# Patient Record
Sex: FEMALE | Race: WHITE
[De-identification: names, ages, dates, MRNs, and addresses within clinical notes are randomized per-mention and may not be internally consistent; named-entity substitution may affect disease eponyms.]

---
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
## 2019-12-06 ENCOUNTER — HOSPITAL ENCOUNTER (OUTPATIENT)
Dept: HOSPITAL 92 - L&D/OP | Age: 19
Discharge: HOME | End: 2019-12-06
Attending: OBSTETRICS & GYNECOLOGY
Payer: COMMERCIAL

## 2019-12-06 DIAGNOSIS — O99.282: ICD-10-CM

## 2019-12-06 DIAGNOSIS — Z88.1: ICD-10-CM

## 2019-12-06 DIAGNOSIS — R10.9: ICD-10-CM

## 2019-12-06 DIAGNOSIS — R12: ICD-10-CM

## 2019-12-06 DIAGNOSIS — O99.89: Primary | ICD-10-CM

## 2019-12-06 DIAGNOSIS — Z3A.27: ICD-10-CM

## 2019-12-06 DIAGNOSIS — E03.9: ICD-10-CM

## 2019-12-06 PROCEDURE — 99283 EMERGENCY DEPT VISIT LOW MDM: CPT

## 2019-12-06 NOTE — PRG
DATE OF SERVICE:  



PRIMARY OBSTETRICIAN:  Kinza Massey MD



CHIEF COMPLAINT:  Abdominal pain, chest pain.



HISTORY OF PRESENT ILLNESS:  The patient is a 19-year-old female with an

intrauterine pregnancy at 27 weeks and 4 days, presenting after waking up at 2 
in

the morning to go to the bathroom and noticing that she was having bad heartburn

that progressively became worse at the top of her stomach.  This heartburn 
continued

to worsen to the point that she felt like she needed to come to the emergency 
room.

At one point, she felt like the pain was best described as heaviness in her 
chest.

At the time of evaluation, the patient says the pain has gotten much better.  
She

does report eating cheese cake last night about 11 o'clock.  She also says that 
she

gets frequent heartburn at night and is not relieved by Tums.  She reports 
heartburn

during the day at times.  She denies any complications has been present with the

pregnancy.  Chest pain per HPI.  She denies nausea, vomiting, diarrhea,

constipation, hip problems, knee problems, muscle weakness, vaginal bleeding,

leakage of fluid, urinary urgency or frequency. 



PAST MEDICAL HISTORY:  Significant for hypothyroidism diagnosed early in this

pregnancy. 



PAST SURGICAL HISTORY:  [place] tooth removal.



SOCIAL HISTORY:  Denies drug, alcohol, or tobacco use.



ALLERGIES:  NO KNOWN DRUG ALLERGIES.



MEDICATIONS:  Prenatal vitamins.



OB LABS:  Unavailable at time of dictation.



REVIEW OF SYSTEMS:  Per HPI.



PHYSICAL EXAMINATION:

VITAL SIGNS:  Blood pressure is 107/73, heart rate 99, respiratory rate 20,

saturating 100% on room air, and temperature 97.7. 

GENERAL:  She appears to be in no acute distress.  She is alert, oriented,

cooperative, and pleasant to interact with. 

HEAD:  Normocephalic and atraumatic. 

LUNGS:  Clear to auscultation bilaterally. 

HEENT:  The patient has nystagmus that she reports is part of a medical 
condition

that she has that she cannot name, but it has been evaluated. 

ABDOMEN:  Gravid, soft, nontender. 

EXTREMITIES:  Nontender nonedematous. 

CHEST:  Nontender to palpation along the parasternal region. 



Fetal heart tracing shows a baseline in the 140s with moderate long-term

variability, positive 10 x 10 accelerations.  No contractions on the monitor. 



The patient was given Bicitra, which immediately within a few minutes of taking 
it

has reduced her pain significantly. 



ASSESSMENT AND PLAN:  The patient is a 19-year-old female with an intrauterine

pregnancy at 27 weeks gestation with bad heartburn, patient unrelieved by Tums 
in

the past.  Bicitra seems to help neutralize what is currently happening, we are 
also

giving her 20 mg of Pepcid and instructions to purchase ranitidine over the 
counter

75 mg to be taken at night.  She has been counseled not to eat late at night, 
but to

make her dinner her last meal, to take the Zantac about 30 minutes before she 
eats

and to see if this improves her symptoms at night.  She can take the Zantac up 
to 2

times a day if needed before her meals.  She has an appointment with Dr. Massey

that she can follow up with and fetus is reassuring by fetal heart tracing. 







Job ID:  [id number]



MTDD

## 2020-02-27 ENCOUNTER — HOSPITAL ENCOUNTER (INPATIENT)
Dept: HOSPITAL 92 - L&D | Age: 20
LOS: 4 days | Discharge: HOME | End: 2020-03-02
Attending: OBSTETRICS & GYNECOLOGY | Admitting: OBSTETRICS & GYNECOLOGY
Payer: COMMERCIAL

## 2020-02-27 VITALS — BODY MASS INDEX: 28.6 KG/M2

## 2020-02-27 DIAGNOSIS — Z3A.39: ICD-10-CM

## 2020-02-27 LAB
HBSAG INDEX: 0.31 S/CO (ref 0–0.99)
HGB BLD-MCNC: 12.8 G/DL (ref 12–16)
MCH RBC QN AUTO: 32.8 PG (ref 25–35)
MCV RBC AUTO: 94.1 FL (ref 78–98)
PLATELET # BLD AUTO: 207 THOU/UL (ref 130–400)
RBC # BLD AUTO: 3.9 MILL/UL (ref 4–5.2)
WBC # BLD AUTO: 13 THOU/UL (ref 4.8–10.8)

## 2020-02-27 PROCEDURE — 86900 BLOOD TYPING SEROLOGIC ABO: CPT

## 2020-02-27 PROCEDURE — 86901 BLOOD TYPING SEROLOGIC RH(D): CPT

## 2020-02-27 PROCEDURE — 85027 COMPLETE CBC AUTOMATED: CPT

## 2020-02-27 PROCEDURE — 36430 TRANSFUSION BLD/BLD COMPNT: CPT

## 2020-02-27 PROCEDURE — 87340 HEPATITIS B SURFACE AG IA: CPT

## 2020-02-27 PROCEDURE — 85049 AUTOMATED PLATELET COUNT: CPT

## 2020-02-27 PROCEDURE — P9016 RBC LEUKOCYTES REDUCED: HCPCS

## 2020-02-27 PROCEDURE — 86780 TREPONEMA PALLIDUM: CPT

## 2020-02-27 PROCEDURE — 36415 COLL VENOUS BLD VENIPUNCTURE: CPT

## 2020-02-27 PROCEDURE — 86850 RBC ANTIBODY SCREEN: CPT

## 2020-02-27 PROCEDURE — 85025 COMPLETE CBC W/AUTO DIFF WBC: CPT

## 2020-02-27 PROCEDURE — 85379 FIBRIN DEGRADATION QUANT: CPT

## 2020-02-27 PROCEDURE — 85384 FIBRINOGEN ACTIVITY: CPT

## 2020-02-27 PROCEDURE — 85362 FIBRIN DEGRADATION PRODUCTS: CPT

## 2020-02-27 PROCEDURE — 85730 THROMBOPLASTIN TIME PARTIAL: CPT

## 2020-02-27 PROCEDURE — 85610 PROTHROMBIN TIME: CPT

## 2020-02-27 PROCEDURE — 85300 ANTITHROMBIN III ACTIVITY: CPT

## 2020-02-28 LAB
APTT PPP: 28.5 SEC (ref 22.9–36.1)
BASOPHILS # BLD AUTO: 0 THOU/UL (ref 0–0.2)
BASOPHILS NFR BLD AUTO: 0.1 % (ref 0–1)
D DIMER PPP FEU-MCNC: 2.13 *MCG/ML (ref 0.27–0.43)
EOSINOPHIL # BLD AUTO: 0 THOU/UL (ref 0–0.7)
EOSINOPHIL NFR BLD AUTO: 0.2 % (ref 0–10)
FIBRINOGEN PPP-MCNC: 431 MG/DL (ref 253–463)
FSP-QUALITATIVE: (no result)
FSP-SEMIQUANTITATIVE: (no result) MCG/ML (ref ?–5)
HGB BLD-MCNC: 10.4 G/DL (ref 12–16)
INR PPP: 1
LYMPHOCYTES # BLD: 1.1 THOU/UL (ref 1.2–3.4)
LYMPHOCYTES NFR BLD AUTO: 7.4 % (ref 28–48)
MCH RBC QN AUTO: 33.5 PG (ref 25–35)
MCV RBC AUTO: 95.6 FL (ref 78–98)
MONOCYTES # BLD AUTO: 1.3 THOU/UL (ref 0.11–0.59)
MONOCYTES NFR BLD AUTO: 9 % (ref 0–4)
NEUTROPHILS # BLD AUTO: 12.1 THOU/UL (ref 1.4–6.5)
NEUTROPHILS NFR BLD AUTO: 83.4 % (ref 31–61)
PLATELET # BLD AUTO: 144 THOU/UL (ref 130–400)
PLATELET # BLD AUTO: 144 THOU/UL (ref 130–400)
PROTHROMBIN TIME: 13.5 SEC (ref 12–14.7)
RBC # BLD AUTO: 3.1 MILL/UL (ref 4–5.2)
SYPHILIS ANTIBODY INDEX: 0.03 S/CO
WBC # BLD AUTO: 14.5 THOU/UL (ref 4.8–10.8)

## 2020-02-28 PROCEDURE — 0UC97ZZ EXTIRPATION OF MATTER FROM UTERUS, VIA NATURAL OR ARTIFICIAL OPENING: ICD-10-PCS | Performed by: OBSTETRICS & GYNECOLOGY

## 2020-02-28 PROCEDURE — 30233N1 TRANSFUSION OF NONAUTOLOGOUS RED BLOOD CELLS INTO PERIPHERAL VEIN, PERCUTANEOUS APPROACH: ICD-10-PCS | Performed by: OBSTETRICS & GYNECOLOGY

## 2020-02-28 RX ADMIN — HYDROCODONE BITARTRATE AND ACETAMINOPHEN PRN TAB: 5; 325 TABLET ORAL at 21:29

## 2020-02-28 NOTE — PDOC.OPDEL
OB Operative/Delivery Note


Delivery Dr/Surgeon: Bryson


Assist: n/a


Pre-Delivery Diagnosis: elective induction


Procedure/Post Delivery Dx: spontaneous vaginal delivery


Weeks gestation: 39


Anesthesia: epidural





- Findings


  ** A


Sex: female


Apgar - 1 min: 9


Apgar - 5 min: 9





- Additional Findings/Plan


Placenta delivered: spontaneous


Repaired Obstetrical Laceration: 2nd degree


Estimated blood loss: 1780qbl


Compilations/Other Findings: 





Pt started having gushing of blood about 5min after delivery of placenta that 

improved with bimanual massage and removing clots out of uterus, no retained POC

, pitocin was bolusing in.  Gushing reoccured in a few min and methergine 

cytotec and hemabate was called for.  Straight cath performed of bladder.  

Continued bimanual massage was performed more clots were removed.  All drugs 

were given within 10min and slow trickle with intermittent gushing of blood 

therefore second IV was called for and more drugs as well as hemorrhage cart.  

Samme was keeping tally of everything and TXA was given.  All drugs were then 

administered according to hemorrhage protocol.  2U PRBC was called for, EBL 

1500cc, stat labs ordered.  Bakri balloon was placed, confirmed placement with 

ultrasound guidance, Brading assist.  420cc were placed in balloon.  Vaginal 

packing placed in vagina and saldaña catheter was placed in bladder.  Bleeding 

stable from bakri balloon.  Pulse went to 150s pt was dizzy.  1U PRBC started.  

IVF bolusing.  Slightly hypertensive from 2 doses of methergine.  Pulse 

decreased to 110s, pt feeling better.  Bleeding from bakri stable 1.5hr 

following placement.   Pt to remain in L&D for strict I&O.  Will repeat labs at 

midnight.  Pt updated on event.  All questions answered.


Post delivery plan: recovery in LICU

## 2020-02-28 NOTE — PDOC.LDHP
Labor and Delivery H&P


Chief complaint: scheduled induction


HPI: 





18yo  at 39w4d by LMP here for elective IOL.  s/p cytotec x 2 overnight.


Current gestational age (weeks): 39


Dating criteria: last menstrual period


Allergies/Adverse Reactions: 


 Allergies











Allergy/AdvReac Type Severity Reaction Status Date / Time


 


clindamycin Allergy Unknown  Verified 20 22:00

## 2020-02-29 LAB
HGB BLD-MCNC: 10.9 G/DL (ref 12–16)
HGB BLD-MCNC: 12.7 G/DL (ref 12–16)
MCH RBC QN AUTO: 32.5 PG (ref 25–35)
MCV RBC AUTO: 93.6 FL (ref 78–98)
PLATELET # BLD AUTO: 126 THOU/UL (ref 130–400)
RBC # BLD AUTO: 3.34 MILL/UL (ref 4–5.2)
WBC # BLD AUTO: 19.3 THOU/UL (ref 4.8–10.8)

## 2020-02-29 RX ADMIN — DOCUSATE CALCIUM SCH MG: 240 CAPSULE, LIQUID FILLED ORAL at 22:56

## 2020-02-29 RX ADMIN — DOCUSATE CALCIUM SCH: 240 CAPSULE, LIQUID FILLED ORAL at 10:55

## 2020-02-29 RX ADMIN — HYDROCODONE BITARTRATE AND ACETAMINOPHEN PRN TAB: 5; 325 TABLET ORAL at 11:46

## 2020-02-29 RX ADMIN — DOCUSATE CALCIUM SCH: 240 CAPSULE, LIQUID FILLED ORAL at 03:24

## 2020-02-29 NOTE — PDOC.PP
Post Partum Progress Note


Post Partum Day #: 1


Subjective: 





Patient doing well this AM. Bleeding overnight has been only 150 mL. Jacoby 

balloon in place. Good urine output with >100 mL/hr. Patient did have one temp 

of 100.7 F. Repeat was 100.0 F. Patient did have significant vaginal 

manipulation 2/2 uterine atony and subsequent PPH. She required methergine, 

hemabate, TXA, and placement of jacoby balloon. She denies significant 

abdominal pain. 


PO intake tolerated: yes


Flatus: yes


Ambulation: yes


 Vital Signs (12 hours)











  Pulse


 


 20 03:24  117 H








 Weight











Weight                         75.75 kg

















- Physical Examination


General: NAD


Cardiovascular: RRR


Respiratory: non-labored breathing


Abdominal: + bowel sounds, lochia (150 mL overnight), no distention, 

appropriately TTP


Fundus firm & at: at umbilicus


Skin: no rash


Neurological: no gross focal deficits


Psychiatric: A&Ox3


Result Diagrams: 


 20 07:45





Additional Labs: 


 Post Partum Labs











Blood Type  A POSITIVE   20  22:55    


 


Hep Bs Antigen  Non-Reactive S/CO (NonReactive)   20  21:56    











(1) Term pregnancy delivered


Code(s): O80 - ENCOUNTER FOR FULL-TERM UNCOMPLICATED DELIVERY   Status: Acute   





(2) Postpartum hemorrhage


Code(s): O72.1 - OTHER IMMEDIATE POSTPARTUM HEMORRHAGE   Status: Acute   





- Assessment/Plan





Routine PP care


- PP day #1 s/p 


- Meeting PP milestones


- Rh positive, rubella immune


- Maternal temp to 100.7F with repeat 100.0F; patient at risk for endometritis 

given vaginal manipulation and jacoby balloon placement. Will continue to 

monitor. Abdomen appropriately TTP. Low threshold for initiation of antibiotics.





PPH


- QBL >1500 mL


- Blood loss overnight 150 mL


- Urine output adequate >100 mL/hr


- H/H 12.8/36.7 --> 10.4/29.6 --> 12.7/36.0


- s/p 2 units pRBC's


- VSS


- Jacoby balloon in place. Remove 80 cc q2h starting at 6:00 AM. 420 mL total 

placed in jacoby balloon.





Dispo: Patient doing well. Anticipate discharge in next 24-48 hours. 





Addendum - Attending





- Attending Attestation


Date/Time: 20 0819





I personally evaluated the patient and discussed the management with Dr. Harris.


I agree with the History, Examination, Assessment and Plan documented above.

## 2020-03-01 LAB
HGB BLD-MCNC: 10.5 G/DL (ref 12–16)
MCH RBC QN AUTO: 33.9 PG (ref 25–35)
MCV RBC AUTO: 94.5 FL (ref 78–98)
MDIFF COMPLETE?: YES
PLATELET # BLD AUTO: 151 THOU/UL (ref 130–400)
RBC # BLD AUTO: 3.1 MILL/UL (ref 4–5.2)
WBC # BLD AUTO: 15.9 THOU/UL (ref 4.8–10.8)

## 2020-03-01 RX ADMIN — DOCUSATE CALCIUM SCH MG: 240 CAPSULE, LIQUID FILLED ORAL at 20:48

## 2020-03-01 RX ADMIN — DOCUSATE CALCIUM SCH MG: 240 CAPSULE, LIQUID FILLED ORAL at 08:12

## 2020-03-01 NOTE — PDOC.PP
Post Partum Progress Note


Post Partum Day #: PPD2


Subjective: 


Resting, no complaints.


PO intake tolerated: yes


Flatus: yes


Ambulation: yes


 Vital Signs (12 hours)











  Temp Pulse Resp BP Pulse Ox


 


 20 23:00  98.9 F  103 H  16  118/73 


 


 20 21:35  98.6 F  109 H  16  145/80 H  99








 Weight











Weight                         75.75 kg

















- Physical Examination


General: NAD


Respiratory: non-labored breathing


Abdominal: no distention


Neurological: no gross focal deficits


Psychiatric: normal affect


Result Diagrams: 


 20 07:45





Additional Labs: 


 Post Partum Labs











Blood Type  A POSITIVE   20  22:55    


 


Hep Bs Antigen  Non-Reactive S/CO (NonReactive)   20  21:56    














- Assessment/Plan


Doing well s/p  with PPH.


Baloon removed yesterday, lochia light to moderate since removal.


Repeat H/H this AM.


Anticipate DC 3/2/2020

## 2020-03-02 VITALS — DIASTOLIC BLOOD PRESSURE: 72 MMHG | TEMPERATURE: 98.2 F | SYSTOLIC BLOOD PRESSURE: 119 MMHG

## 2020-03-02 RX ADMIN — DOCUSATE CALCIUM SCH MG: 240 CAPSULE, LIQUID FILLED ORAL at 08:42

## 2020-03-02 NOTE — PDOC.EVN
Event Note





- Event Note


Event Note: 





H/H reviewed yesterday and was stable. tachycardia has resolved as the day 

progressed. Given the post partum hemorrhage and use of bakri balloon pt has 

stayed given increased risk for other complications such as infection. Dr Massey resuming care.

## 2020-03-02 NOTE — PDOC.PP
Post Partum Progress Note


Post Partum Day #: 3


PO intake tolerated: yes


Flatus: yes


Ambulation: yes


 Vital Signs (12 hours)











  Temp Pulse Resp BP Pulse Ox


 


 03/02/20 08:25  98.2 F  90  20  119/72  97








 Weight











Weight                         167 lb

















- Physical Examination


General: NAD


Respiratory: non-labored breathing


Abdominal: no distention, appropriately TTP


Result Diagrams: 


 03/01/20 05:43





Additional Labs: 


 Post Partum Labs











Blood Type  A POSITIVE   02/27/20  22:55    


 


Hep Bs Antigen  Non-Reactive S/CO (NonReactive)   02/27/20  21:56

## 2023-03-25 ENCOUNTER — HOSPITAL ENCOUNTER (EMERGENCY)
Dept: HOSPITAL 57 - BURERS | Age: 23
Discharge: HOME | End: 2023-03-25
Payer: COMMERCIAL

## 2023-03-25 DIAGNOSIS — N39.0: Primary | ICD-10-CM

## 2023-03-25 LAB
BACTERIA UR QL AUTO: (no result) HPF
PREGU CONTROL BACKGROUND?: (no result)
PREGU CONTROL BAR APPEAR?: YES
PROT UR STRIP.AUTO-MCNC: 30 MG/DL
RBC UR QL AUTO: (no result) HPF (ref 0–3)
SP GR UR STRIP: 1.03 (ref 1–1.04)
WBC UR QL AUTO: (no result) HPF (ref 0–3)

## 2023-03-25 PROCEDURE — 99284 EMERGENCY DEPT VISIT MOD MDM: CPT

## 2023-03-25 PROCEDURE — 81003 URINALYSIS AUTO W/O SCOPE: CPT

## 2023-03-25 PROCEDURE — 81025 URINE PREGNANCY TEST: CPT

## 2023-03-25 PROCEDURE — 81015 MICROSCOPIC EXAM OF URINE: CPT
